# Patient Record
Sex: MALE | Race: OTHER | URBAN - METROPOLITAN AREA
[De-identification: names, ages, dates, MRNs, and addresses within clinical notes are randomized per-mention and may not be internally consistent; named-entity substitution may affect disease eponyms.]

---

## 2018-09-10 ENCOUNTER — EMERGENCY (EMERGENCY)
Facility: HOSPITAL | Age: 38
LOS: 0 days | Discharge: HOME | End: 2018-09-10
Attending: EMERGENCY MEDICINE
Payer: COMMERCIAL

## 2018-09-10 VITALS
RESPIRATION RATE: 16 BRPM | TEMPERATURE: 98 F | SYSTOLIC BLOOD PRESSURE: 126 MMHG | HEIGHT: 71 IN | OXYGEN SATURATION: 99 % | HEART RATE: 73 BPM | WEIGHT: 195.99 LBS | DIASTOLIC BLOOD PRESSURE: 91 MMHG

## 2018-09-10 PROCEDURE — 72070 X-RAY EXAM THORAC SPINE 2VWS: CPT | Mod: 26

## 2018-09-10 PROCEDURE — 99283 EMERGENCY DEPT VISIT LOW MDM: CPT

## 2018-09-10 RX ORDER — METHOCARBAMOL 500 MG/1
750 TABLET, FILM COATED ORAL ONCE
Qty: 0 | Refills: 0 | Status: COMPLETED | OUTPATIENT
Start: 2018-09-10 | End: 2018-09-10

## 2018-09-10 RX ORDER — METHOCARBAMOL 500 MG/1
1 TABLET, FILM COATED ORAL
Qty: 21 | Refills: 0 | OUTPATIENT
Start: 2018-09-10 | End: 2018-09-16

## 2018-09-10 RX ORDER — KETOROLAC TROMETHAMINE 30 MG/ML
30 SYRINGE (ML) INJECTION ONCE
Qty: 0 | Refills: 0 | Status: DISCONTINUED | OUTPATIENT
Start: 2018-09-10 | End: 2018-09-10

## 2018-09-10 RX ADMIN — METHOCARBAMOL 750 MILLIGRAM(S): 500 TABLET, FILM COATED ORAL at 21:13

## 2018-09-10 RX ADMIN — Medication 30 MILLIGRAM(S): at 21:13

## 2018-09-10 NOTE — ED PROVIDER NOTE - NS ED MD EM SELECTION
[id number] Detailed
I have personally seen and examined this patient. I have fully participated in the care of this patient. I have reviewed all pertinent clinical information, including history physical exam, plan and the Resident's note and agree except as noted

## 2018-09-10 NOTE — ED ADULT NURSE NOTE - NSIMPLEMENTINTERV_GEN_ALL_ED
Implemented All Universal Safety Interventions:  [place] to call system. Call bell, personal items and telephone within reach. Instruct patient to call for assistance. Room bathroom lighting operational. Non-slip footwear when patient is off stretcher. Physically safe environment: no spills, clutter or unnecessary equipment. Stretcher in lowest position, wheels locked, appropriate side rails in place.

## 2018-09-10 NOTE — ED ADULT TRIAGE NOTE - CHIEF COMPLAINT QUOTE
Patient reports "severe back pain- since Sunday night." Pain started "after coughing violently- I choked." Denies fever. Denies trauma. Pain located in middle of back. Pain increases with movement. Patient states "it got better when I saw the chiropractor, but then it got worse."
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
## 2018-09-12 DIAGNOSIS — S29.012A STRAIN OF MUSCLE AND TENDON OF BACK WALL OF THORAX, INITIAL ENCOUNTER: ICD-10-CM

## 2018-09-12 DIAGNOSIS — M54.9 DORSALGIA, UNSPECIFIED: ICD-10-CM

## 2023-04-18 PROBLEM — Z00.00 ENCOUNTER FOR PREVENTIVE HEALTH EXAMINATION: Status: ACTIVE | Noted: 2023-04-18

## 2023-04-21 ENCOUNTER — APPOINTMENT (OUTPATIENT)
Dept: ORTHOPEDIC SURGERY | Facility: CLINIC | Age: 43
End: 2023-04-21
Payer: COMMERCIAL

## 2023-04-21 DIAGNOSIS — M77.11 LATERAL EPICONDYLITIS, RIGHT ELBOW: ICD-10-CM

## 2023-04-21 DIAGNOSIS — Z78.9 OTHER SPECIFIED HEALTH STATUS: ICD-10-CM

## 2023-04-21 DIAGNOSIS — M22.2X2 PATELLOFEMORAL DISORDERS, LEFT KNEE: ICD-10-CM

## 2023-04-21 PROCEDURE — 99203 OFFICE O/P NEW LOW 30 MIN: CPT

## 2023-04-21 PROCEDURE — 73080 X-RAY EXAM OF ELBOW: CPT | Mod: RT

## 2023-04-21 RX ORDER — DICLOFENAC SODIUM 1% 10 MG/G
1 GEL TOPICAL DAILY
Qty: 1 | Refills: 1 | Status: ACTIVE | COMMUNITY
Start: 2023-04-21 | End: 1900-01-01

## 2023-04-21 NOTE — IMAGING
[de-identified] : (Physical Exam: Elbow)\par \par Laterality is right \par \par Patient is in no acute distress, alert and oriented\par Sensation is grossly intact to light touch in the hand\par Motor function is 5/5 in the hand\par Capillary refill is less than 2 seconds in the fingers\par Lymphadenopathy is not present\par Peripheral edema is not present\par \par Skin is intact \par Olecranon bursa swelling is not present\par Biceps deformity is not present\par Intrinsic atrophy is not present\par \par Lateral epicondyle tenderness is present \par Medial epicondyle tenderness is not present \par Radial head tenderness is not present\par Biceps tenderness is not present \par Triceps tenderness is not present \par \par Extension is 0\par Flexion is 140\par Pronation is 80\par Supination is 80\par \par Flexion strength is 5/5\par Extension strength is 5/5 \par Pronation strength is 5/5 \par Supination strength is 5/5 \par \par Cozen's test is abnormal\par Biceps hook test is normal \par Tinel's test of ulnar nerve is normal\par Moving valgus stress test is normal\par \par \par (Physical Exam: Knee)\par \par Laterality is left\par \par Skin is intact \par Effusion is not present \par Atrophy is not present\par Antalgic gait is not present \par \par Medial joint line tenderness is not present \par Lateral joint line tenderness is not present \par Patellar tenderness is present \par Calf tenderness is not present \par \par Knee extension is 0\par Knee flexion is 135\par \par Quadriceps strength is 5/5\par Hamstring strength is 5/5\par \par Lachman is normal \par Posterior drawer is normal\par Varus stress stability is normal\par Valgus stress stability is normal\par \par Medial Sergei test is normal\par Lateral Sergei test is normal\par Patellofemoral grind test is abnormal\par Patellar apprehension test is normal\par  [Right] : right elbow [There are no fractures, subluxations or dislocations. No significant abnormalities are seen] : There are no fractures, subluxations or dislocations. No significant abnormalities are seen

## 2023-04-21 NOTE — DISCUSSION/SUMMARY
[de-identified] : (Assessment and Plan)\par \par History, clinical examination and imaging were most consistent with:\par -right elbow lateral epicondylitis\par -left knee patellofemoral syndrome\par \par The diagnosis was explained in detail. The potential non-surgical and surgical treatments were reviewed. The relative risks and benefits of each option were considered relative to the patient’s age, activity level, medical history, symptom severity and previously attempted treatments. \par \par -Non-surgical treatment was selected. \par -Patient will begin bracing with counterforce strap and removable wrist brace for the elbow, and a chopart strap for the left knee. \par -Patient defers formal PT and will proceed with a home exercise program. \par -Over the counter NSAID and voltaren gel as needed for pain. GI precautions discussed.\par -The added clinical utility of an MRI was discussed. The patient deferred further diagnostic testing at this time.\par -Follow up in 6 to 8 weeks if symptoms persist or fail to improve. Consider formal PT at that time. \par \par \par (MDM) \par \par Problem Complexity\par -Moderate: chronic illness with exacerbation\par \par Risk\par -Low: over the counter medication\par \par -Patient has not been seen by another provider in my practice within the past 2 years who specializes in orthopedic sports medicine, shoulder or elbow surgery. \par \par The patient was advised to consult with their primary medical provider prior to initiation of any new medications to reduce the risk of adverse effects specific to their long-term home medications and medical history. The risk of gastrointestinal irritation and kidney injury specific to long-term NSAID use was discussed.   \par \par

## 2023-04-21 NOTE — HISTORY OF PRESENT ILLNESS
[de-identified] : (History of Present Illness)\par \par Patient age in years is 42 \par Occupation is  \par \par Body part causing symptoms is the RT elbow\par Symptoms began about 6 weeks ago, no known injury\par Location of pain is lateral \par Quality of pain is dull\par Pain score at rest is  2/10\par Pain score during activity is  8/10\par Radicular symptoms are not present \par Prior treatments include OTC medication\par Patient's condition is not associated with worker’s compensation, no-fault or interscholastic athletics \par \par He also reports mild anterior left knee pain worse with stairs. XR are deferred